# Patient Record
(demographics unavailable — no encounter records)

---

## 2025-04-30 NOTE — PHYSICAL EXAM
[General Appearance - Alert] : alert [General Appearance - In No Acute Distress] : in no acute distress [Ankle Swelling (On Exam)] : not present [Varicose Veins Of Lower Extremities] : not present [] : not present [Delayed in the Right Toes] : capillary refills normal in right toes [Delayed in the Left Toes] : capillary refills normal in the left toes [2+] : left foot dorsalis pedis 2+ [de-identified] : Heel pain right more than left. [FreeTextEntry1] : Thick toe nails. [Sensation] : the sensory exam was normal to light touch and pinprick [No Focal Deficits] : no focal deficits [Deep Tendon Reflexes (DTR)] : deep tendon reflexes were 2+ and symmetric [Motor Exam] : the motor exam was normal [Oriented To Time, Place, And Person] : oriented to person, place, and time [Impaired Insight] : insight and judgment were intact [Affect] : the affect was normal

## 2025-04-30 NOTE — ASSESSMENT
[FreeTextEntry1] :   Plan: Counseling - Plantar Fasciitis   The vast majority of patients will improve with a guided program of conservative management.  It may take a protracted period of time for symptoms to diminish.  This is the most common cause of heel pain.  Plantar fasciitis occurs when the fascia (a band of tissue) on the bottom of your foot becomes irritated and inflamed from overuse.  Often there is no identifiable cause, however, it is generally accepted that obesity and repetitive impact sports are risk factors.  Overuse, trauma, and generalized inflammatory conditions may be causative to factors as well.  Conservative options are fairly broad and include rest, ice, corrective shoe wear, splints, heel pads, or orthotics, foot and toe exercise, massage therapy, and physical therapy.  Cortisone injection may be helpful on a limited basis.  Surgical treatment is reserved for those cases that do not respond to conservative management. Contact office if pain or disability worsens.   The patient has had pain for over 6 months.  The patient has tried all off-weight bearing, OTC inserts, nsaids, cortisone injections at home ice packs and stretching and massage, range of motion exercises, physical therapy, modified activity, shoe gear changes.  All of these conservative treatments have failed to alleviate the patient's pain.  In an effort to avoid surgery custom molded orthotics are recommended.   Medication Counseling NSAIDS: I discussed with the patient that NSAIDs should be taken with food.  Prolonged use of NSAIDs can result in the development of stomach ulcers or bleeding.  Patient advised to stop taking NSAIDs if abdominal pain occurs.  The patient verbalized understanding of the proper use and the possible adverse effects of NSAIDs.  All of the patient's questions and concerns were addressed and discussed.   OTC recommendations: OTC medication: Discussed taking Aleve twice a day.   Physical therapy: Physical therapy focuses on stretching of your calf muscles and plantar fascia.  There may also be focus on foot and toe exercises, icing, and therapeutic massage.  Ultrasound may help to reduce the inflammation.  Therapeutic exercises can be performed to help the condition.  As the symptoms resolve, rehabilitation generally progresses not more functional activity exercises, such as walking.   Home exercise program counseling: Discussed range of motion and stretching exercises.  Handouts were given to the patient and reviewed.  Discussed contrast baths and deep massage.   Surgical options and alternatives Extracorporeal shock wave therapy: This is a noninvasive procedure that employs high energy shockwaves to heal the plantar fascia.  The results with this method of treatment are unpredictable. Procedure options counseling other: We discussed possible class IV laser treatments for pain and inflammation     Plan: Counseling - Custom Orthotic   The benefits of custom functional orthotics were thoroughly reviewed with patient today.  Biomechanical optimization based on patient's foot type and morphologies is medically necessary.  The severity of the foot condition is such that a lesser means, for example, inlay shoes, shoe modifications, etc., cannot adduct with Edd compensate for the deformity or if there is a leg discrepancy length of at least 1.5 inches or greater.   The expectations of the orthosis were discussed, and the patient's questions and concerns were addressed to their satisfaction.   Contact our office for any questions or concerns regarding custom functional orthotics.   Orthotics will go to the sulcus we will use intrinsic posting only so patient can use orthotics in various types of shoes and sneakers.     Plan: Home Exercise Program - Foot   Indication: Plantar fasciitis, Bilateral  Right foot therapeutic exercises: Range of motion exercises (plantar flexion, dorsiflexion, foot circles), calf stretching, heel cord stretch, and Theraband resistive exercise.  Recommend frequency of 23 times per day for 68 s Weight bearing, right foot: Weight bearing as tolerated.   Left foot therapeutic exercises: Range of motion exercises (plantar flexion, dorsiflexion, foot circles), calf stretching, heel cord stretch, and Theraband resistive exercises. Recommend frequency of 23 times per day for 68 s. Weightbearing, left foot: Weightbearing as tolerated. Other recommendations: If you are overweight, try to lose weight.  Though more your body wanes, though more your ankles are burning with supporting you.  Avoid high impact aerobic activities, running, and jogging.  Walking, swimming, and cycling are in general and the best activities for cardiovascular exercise.  Try to incorporate noneimpact exercises (example: Walking, swimming, stationary or recumbent bike, elliptical) if tolerated into her daily routine to maintain an active and healthy lifestyle.  Avoid uneven surfaces during workouts. Precautions: Do only those exercises that have been recommended to you.  Should you feel significant pain or discomfort, please reduce the number of repetitions or frequency as tolerated. If pain still persists, discontinue exercises and contact the office.     Plan: Footwear recommendations The following footwear recommendations have been made: Stiffsole shoes The following footwear recommendations have been made: Sneakers with adequate arch support   Patient will begin physical therapy.  We may reevaluate thickened fungal toenails in future visits.  Prescription written for ibuprofen which has helped patient in the past.

## 2025-04-30 NOTE — PHYSICAL EXAM
[General Appearance - Alert] : alert [General Appearance - In No Acute Distress] : in no acute distress [Ankle Swelling (On Exam)] : not present [Varicose Veins Of Lower Extremities] : not present [] : not present [Delayed in the Right Toes] : capillary refills normal in right toes [Delayed in the Left Toes] : capillary refills normal in the left toes [2+] : left foot dorsalis pedis 2+ [de-identified] : Heel pain right more than left. [FreeTextEntry1] : Thick toe nails. [Sensation] : the sensory exam was normal to light touch and pinprick [No Focal Deficits] : no focal deficits [Deep Tendon Reflexes (DTR)] : deep tendon reflexes were 2+ and symmetric [Motor Exam] : the motor exam was normal [Oriented To Time, Place, And Person] : oriented to person, place, and time [Impaired Insight] : insight and judgment were intact [Affect] : the affect was normal